# Patient Record
Sex: FEMALE | HISPANIC OR LATINO | Employment: FULL TIME | URBAN - METROPOLITAN AREA
[De-identification: names, ages, dates, MRNs, and addresses within clinical notes are randomized per-mention and may not be internally consistent; named-entity substitution may affect disease eponyms.]

---

## 2017-01-09 ENCOUNTER — OFFICE VISIT (OUTPATIENT)
Dept: OBSTETRICS AND GYNECOLOGY | Facility: CLINIC | Age: 57
End: 2017-01-09

## 2017-01-09 VITALS
DIASTOLIC BLOOD PRESSURE: 71 MMHG | HEIGHT: 63 IN | WEIGHT: 188 LBS | BODY MASS INDEX: 33.31 KG/M2 | HEART RATE: 85 BPM | SYSTOLIC BLOOD PRESSURE: 132 MMHG

## 2017-01-09 DIAGNOSIS — N90.4 LICHEN SCLEROSUS OF FEMALE GENITALIA: Primary | ICD-10-CM

## 2017-01-09 PROCEDURE — 99213 OFFICE O/P EST LOW 20 MIN: CPT | Performed by: OBSTETRICS & GYNECOLOGY

## 2017-01-09 RX ORDER — CLOBETASOL PROPIONATE 0.5 MG/G
OINTMENT TOPICAL 2 TIMES DAILY
Qty: 1 EACH | Refills: 0 | Status: SHIPPED | OUTPATIENT
Start: 2017-01-09

## 2017-01-09 NOTE — MR AVS SNAPSHOT
Mattie Siddiqui   1/9/2017 9:15 AM   Office Visit    Dept Phone:  [phone]   Encounter #:  [id number]    Provider:  Erendira Evans MD   Department:  [facility]                Your Full Care Plan              Today's Medication Changes          These changes are accurate as of: 1/9/17  9:20 AM.  If you have any questions, ask your nurse or doctor.               New Medication(s)Ordered:     clobetasol 0.05 % ointment   Commonly known as:  TEMOVATE   Apply  topically 2 (Two) Times a Day. For 2 weeks prn vulvar itching   Started by:  Erendira Evans MD            Where to Get Your Medications      These medications were sent to [facility] [address] [place] [address] - [address] [place] [address] - [phone] [facility] [phone] [address] [address]     Phone:  [phone]     clobetasol 0.05 % ointment                  Your Updated Medication List          This list is accurate as of: 1/9/17  9:20 AM.  Always use your most recent med list.                clobetasol 0.05 % ointment   Commonly known as:  TEMOVATE   Apply  topically 2 (Two) Times a Day. For 2 weeks prn vulvar itching               Instructions     None    Patient Instructions History      Upcoming Appointments     Visit Type Date Time Department    GYN FOLLOW UP 1/9/2017  9:15 AM [facility]    WELLNESS 3/21/2017 10:15 AM [facility]      MyChart Signup     Our records indicate that you have declined [facility] MyChart signup. If you would like to sign up for [facility], please email [email or web address] or call [phone] to obtain an activation code.             Other Info from Your Visit           Your Appointments     Mar 21, 2017 10:15 AM EDT   Wellness with Erendira Evans MD   [facility] (--)    [address] [place] [id number] [phone]              Allergies     No Known Allergies  "     Vital Signs     Blood Pressure Pulse Height Weight Body Mass Index       132/71 85 63\" (160 cm) 188 lb (85.3 kg) 33.3 kg/m2         "

## 2017-01-09 NOTE — PROGRESS NOTES
"Chief Complaint   Patient presents with   • Vaginal Pain       History of Present Illness    Patient is a 56 y.o. female complains of still having area itching on the top of her vulva. Patient was diagnosed with lichen sclerosis in 5/16. Patient was on clobetasol with good results but ran out of cream.     The following portions of the patient's history were reviewed and updated as appropriate: allergies, current medications, past family history, past medical history, past social history, past surgical history and problem list.    Review of Systems   Genitourinary:        See HPI   All other systems reviewed and are negative.      Vitals:    01/09/17 0854   BP: 132/71   Pulse: 85   Weight: 188 lb (85.3 kg)   Height: 63\" (160 cm)       Objective   Physical Exam   Constitutional: She appears well-developed and well-nourished.   HENT:   Head: Normocephalic and atraumatic.   Abdominal: Soft. She exhibits no distension and no mass. There is no tenderness. There is no rebound and no guarding. No hernia.   Genitourinary: Rectum normal and vagina normal. Rectal exam shows no external hemorrhoid. No vaginal discharge found.   Genitourinary Comments: Area of lichen sclerosis noted along clitoris   Musculoskeletal: Normal range of motion.   Lymphadenopathy:        Right: No inguinal adenopathy present.        Left: No inguinal adenopathy present.   Neurological: She is alert.   Skin: Skin is warm.   Psychiatric: She has a normal mood and affect.         Assessment/Plan   Mattie was seen today for vaginal pain.    Diagnoses and all orders for this visit:    Lichen sclerosus of female genitalia    Other orders  -     clobetasol (TEMOVATE) 0.05 % ointment; Apply  topically 2 (Two) Times a Day. For 2 weeks prn vulvar itching    Local care reviewed with patient.    (10/15 minutes spent in face to face patient consultation)         Return for Annual physical.    "